# Patient Record
Sex: MALE | Race: WHITE | Employment: STUDENT | URBAN - METROPOLITAN AREA
[De-identification: names, ages, dates, MRNs, and addresses within clinical notes are randomized per-mention and may not be internally consistent; named-entity substitution may affect disease eponyms.]

---

## 2019-04-09 ENCOUNTER — OFFICE VISIT (OUTPATIENT)
Dept: FAMILY MEDICINE CLINIC | Age: 19
End: 2019-04-09
Payer: COMMERCIAL

## 2019-04-09 VITALS
DIASTOLIC BLOOD PRESSURE: 90 MMHG | HEART RATE: 87 BPM | SYSTOLIC BLOOD PRESSURE: 146 MMHG | RESPIRATION RATE: 16 BRPM | HEIGHT: 69 IN | WEIGHT: 271.6 LBS | BODY MASS INDEX: 40.23 KG/M2 | TEMPERATURE: 98 F

## 2019-04-09 DIAGNOSIS — I10 ESSENTIAL HYPERTENSION: ICD-10-CM

## 2019-04-09 DIAGNOSIS — E66.01 CLASS 3 SEVERE OBESITY DUE TO EXCESS CALORIES WITHOUT SERIOUS COMORBIDITY WITH BODY MASS INDEX (BMI) OF 40.0 TO 44.9 IN ADULT (HCC): Primary | ICD-10-CM

## 2019-04-09 PROCEDURE — 99203 OFFICE O/P NEW LOW 30 MIN: CPT | Performed by: INTERNAL MEDICINE

## 2019-04-09 RX ORDER — BLOOD PRESSURE TEST KIT
KIT MISCELLANEOUS
Qty: 1 KIT | Refills: 0 | Status: SHIPPED | OUTPATIENT
Start: 2019-04-09

## 2019-04-09 ASSESSMENT — PATIENT HEALTH QUESTIONNAIRE - PHQ9
2. FEELING DOWN, DEPRESSED OR HOPELESS: 0
SUM OF ALL RESPONSES TO PHQ QUESTIONS 1-9: 0
SUM OF ALL RESPONSES TO PHQ QUESTIONS 1-9: 0
1. LITTLE INTEREST OR PLEASURE IN DOING THINGS: 0
SUM OF ALL RESPONSES TO PHQ9 QUESTIONS 1 & 2: 0

## 2019-04-09 NOTE — PROGRESS NOTES
Visit Information    Have you changed or started any medications since your last visit including any over-the-counter medicines, vitamins, or herbal medicines? no   Are you having any side effects from any of your medications? -  no  Have you stopped taking any of your medications? Is so, why? -  no    Have you seen any other physician or provider since your last visit? No  Have you had any other diagnostic tests since your last visit? No  Have you been seen in the emergency room and/or had an admission to a hospital since we last saw you? No  Have you had your routine dental cleaning in the past 6 months? no    Have you activated your [employer] account? If not, what are your barriers?  No:      Patient Care Team:  Bridgette Philippe PA-C as PCP - General (Physician Assistant)  Bridgette Philippe PA-C (Physician Assistant)    Medical History Review  Past Medical, Family, and Social History reviewed and does not contribute to the patient presenting condition    Health Maintenance   Topic Date Due    Varicella Vaccine (1 of 2 - 13+ 2-dose series) 07/06/2011    HPV vaccine (1 - Male 3-dose series) 07/06/2013    HIV screen  07/06/2013    DTaP/Tdap/Td vaccine (1 - Tdap) 07/06/2017    Flu vaccine (Season Ended) 09/01/2019    Meningococcal (ACWY) Vaccine  Aged Out    Pneumococcal 0-64 years Vaccine  Aged Out

## 2019-04-09 NOTE — PROGRESS NOTES
Neurological: Negative for dizziness, weakness and headaches. PHYSICAL EXAM:        BP (!) 143/81 (Site: Right Upper Arm, Position: Sitting, Cuff Size: Large Adult)   Pulse 87   Temp 98 °F (36.7 °C) (Oral)   Resp 16   Ht 5' 9\" (1.753 m)   Wt 271 lb 9.6 oz (123.2 kg)   BMI 40.11 kg/m²      General appearance - well appearing, not in  distress. Mental status - alert and cooperative . Head: Normocephalic, without obvious abnormality, atraumatic. Eye: PERRL, conjunctiva/corneas clear, EOM's intact. Neck - Supple, no significant adenopathy . Chest - Clear to auscultation, no wheezes, rales or rhonchi, symmetric air entry. Heart -  regular rhythm, normal S1, S2, no murmurs. Abdomen - Soft, nontender, nondistended, no masses or organomegaly. Neurological - Alert, oriented, normal speech, no focal findings or movement disorder noted. .   Extremities -  No pedal edema, no clubbing or cyanosis. Labs:       Health Maintenance Due   Topic Date Due    Varicella Vaccine (1 of 2 - 13+ 2-dose series) 07/06/2011    HPV vaccine (1 - Male 3-dose series) 07/06/2013    HIV screen  07/06/2013    DTaP/Tdap/Td vaccine (1 - Tdap) 07/06/2017        ASSESSMENT AND PLAN    1. Class 3 severe obesity due to excess calories without serious comorbidity with body mass index (BMI) of 40.0 to 44.9 in adult (HCC)    - Hemoglobin A1C; Future  - Lipid Panel; Future  - TSH with Reflex; Future    2. Essential hypertension    - CBC Auto Differential; Future  - Comprehensive Metabolic Panel; Future  - TSH with Reflex; Future  - Blood Pressure KIT; Use as directed Dx HTN  Dispense: 1 kit; Refill: 0      · Home BP monitoring   · DASH diet   · Labs as ordered   · Strategies  for weight loss including regular exercise and diet was discussed. Pt should avoid deep fried food and high sugar food. Should cut down portion in half and include salads, oat meal and brown rice in diet.     · Sandrita Saeed received counseling on the following healthy behaviors: exercise and medication adherence  · Discussed use, benefit, and side effects of prescribed medications. Barriers to medication compliance addressed. All patient questions answered. Pt voiced understanding. · The return visit should be in 4 weeks      Leyla Persaud MD  [facility]ending [facility]  [address] [place] [id number]  Dept: [phone]  4/9/19      This note is created with the assistance of a speech-recognition program. While intending to generate a document that actually reflects the content of the visit, the document can still have some mistakes which may not have been identified and corrected by editing.